# Patient Record
Sex: FEMALE | Race: OTHER
[De-identification: names, ages, dates, MRNs, and addresses within clinical notes are randomized per-mention and may not be internally consistent; named-entity substitution may affect disease eponyms.]

---

## 2021-03-16 PROBLEM — Z00.00 ENCOUNTER FOR PREVENTIVE HEALTH EXAMINATION: Status: ACTIVE | Noted: 2021-03-16

## 2021-04-06 PROBLEM — K21.9 GERD (GASTROESOPHAGEAL REFLUX DISEASE): Status: ACTIVE | Noted: 2021-04-06

## 2021-04-06 PROBLEM — Z86.16 HISTORY OF COVID-19: Status: RESOLVED | Noted: 2021-04-06 | Resolved: 2021-04-06

## 2021-04-06 PROBLEM — Z86.79 HISTORY OF HYPERTENSION: Status: RESOLVED | Noted: 2021-04-06 | Resolved: 2021-04-06

## 2021-04-07 ENCOUNTER — APPOINTMENT (OUTPATIENT)
Dept: GASTROENTEROLOGY | Facility: CLINIC | Age: 61
End: 2021-04-07

## 2021-04-07 DIAGNOSIS — Z86.79 PERSONAL HISTORY OF OTHER DISEASES OF THE CIRCULATORY SYSTEM: ICD-10-CM

## 2021-04-07 DIAGNOSIS — K21.9 GASTRO-ESOPHAGEAL REFLUX DISEASE W/OUT ESOPHAGITIS: ICD-10-CM

## 2021-04-07 DIAGNOSIS — Z86.16 PERSONAL HISTORY OF COVID-19: ICD-10-CM

## 2021-04-07 NOTE — HISTORY OF PRESENT ILLNESS
[de-identified] : OBINNA MATIAS  is being evaluated at the request of Eleanor Pizano for an opinion re:\par colon cancer screening and GERD

## 2023-04-20 ENCOUNTER — APPOINTMENT (OUTPATIENT)
Dept: GASTROENTEROLOGY | Facility: CLINIC | Age: 63
End: 2023-04-20
Payer: COMMERCIAL

## 2023-04-20 VITALS
SYSTOLIC BLOOD PRESSURE: 128 MMHG | BODY MASS INDEX: 28.55 KG/M2 | RESPIRATION RATE: 16 BRPM | HEIGHT: 60 IN | OXYGEN SATURATION: 98 % | HEART RATE: 56 BPM | WEIGHT: 145.44 LBS | DIASTOLIC BLOOD PRESSURE: 76 MMHG

## 2023-04-20 PROCEDURE — 99204 OFFICE O/P NEW MOD 45 MIN: CPT

## 2023-04-20 NOTE — CONSULT LETTER
[Dear  ___] : Dear  [unfilled], [Consult Letter:] : I had the pleasure of evaluating your patient, [unfilled]. [Please see my note below.] : Please see my note below. [Consult Closing:] : Thank you very much for allowing me to participate in the care of this patient.  If you have any questions, please do not hesitate to contact me. [Sincerely,] : Sincerely, [FreeTextEntry3] : Steffanie Monte M.D.\par

## 2023-04-20 NOTE — HISTORY OF PRESENT ILLNESS
[FreeTextEntry1] : 61 yo F here for liver mass. \par \par Patient reports she underwent liver biopsy somewhere in [place]. \par Per PCP note 4/22 - pt saw heme/onc and eval by tumor board. Had wokrup for liver mass. Biopsy performed - sclerosing hemangioma. Also mass at T12 which was apaprently suspicious but no clear primary. Was recommended to repeat imaging in a few months. Pt did not follow up. \par \par Pt then went to [facility] for R sided abd pain, found to have large 11-13 cm liver mass , probably giant cavernous hemangioma. \par \par Pt reports RUQ abd pain for 2 months\par rubs aloe vera in RUQ and this helps\par \par \par Soc: no tobacco or significant EtOH\par \par Family Hx: no significant GI family history including colon cancer, stomach cancer, IBD, celiac\par \par ROS:\par constitutional: no weight loss, fevers\par ENT: no deafness\par Eyes: no blindness\par Neck: no lymphadenopathy\par Chest: no shortness of breath, no cough\par Cardiac: no chest pain, no palpitations\par Vascular: no leg swelling\par GI: no abdominal pain, nausea, vomiting, diarrhea, constipation, rectal bleeding, melena, dysphagia,  unless otherwise noted in HPI\par : no dysuria, dark urine\par Skin: no rashes, lesions, jaundice\par Heme: no bleeding\par Endocrine: no DM  unless otherwise stated in HPI\par \par Physical Exam: (VS noted below)\par General: alert, comfortable, in no acute distress\par Eyes: normal sclera, anicteric\par Neck: normal, supple, no neck mass\par Pulm: no respiratory distress, clear to auscultation bilaterally \par Heart: RRR, normal S1 S2\par Abd: Soft, non-tender, non-distended, normal bowel sounds, no appreciable hepatosplenomegaly, no masses palpated\par Rectal: deferred\par Ext: warm and well perfused, no edema\par Skin: no rashes, no juandice\par Neuro: alert, grossly nonfocal\par \par Labs/imaging/prior endoscopic results were reviewed to the extent available and pertinent findings noted in HPI\par

## 2023-04-20 NOTE — ASSESSMENT
[FreeTextEntry1] : 63 yo F with 11-13 cm liver mass, likely giant cavernous hemangioma.  Pt reports her R sided abd pain is mild.\par \par Patient has already had biopsy of liver mass and eval [facility].\par \par Recommend she re-establish care at previous facility and/or referral to [facility] for multidisciplinary approach with hepatology team and tumor board.\par Given giant hemangioma >10cm, may be candidate for transcatheter arterial embolization +/- elective surgery\par \par I will discuss my recommendations directly with her PCP Dr. Pizano

## 2023-06-29 ENCOUNTER — APPOINTMENT (OUTPATIENT)
Dept: GASTROENTEROLOGY | Facility: CLINIC | Age: 63
End: 2023-06-29
Payer: COMMERCIAL

## 2023-06-29 VITALS
SYSTOLIC BLOOD PRESSURE: 110 MMHG | HEIGHT: 60 IN | BODY MASS INDEX: 28.47 KG/M2 | WEIGHT: 145 LBS | DIASTOLIC BLOOD PRESSURE: 70 MMHG

## 2023-06-29 DIAGNOSIS — Z12.11 ENCOUNTER FOR SCREENING FOR MALIGNANT NEOPLASM OF COLON: ICD-10-CM

## 2023-06-29 PROCEDURE — 99214 OFFICE O/P EST MOD 30 MIN: CPT

## 2023-06-29 NOTE — ASSESSMENT
[FreeTextEntry1] : - liver lesion - unclear if symptomatic but very large size warrants referral to hepatology / HBS - we made an appt for pt for next month w/ Dr. Zamora\par \par - Colon cancer screening - pt due for screening next year - encouraged colonoscopy vs repeat cologuard\par \par PMD/consultation/hospital notes and Labs/imaging/prior endoscopic results reviewed to extent noted in HPI; and, if procedure code billed on this visit for lab draw, this serves to signify that labs were drawn here in this office.\par

## 2023-06-29 NOTE — HISTORY OF PRESENT ILLNESS
[FreeTextEntry1] : 63f HTN, hx liver mass - here for f/u .  \par \par Pt does not provide any hx beyond vague R Flank discomfort for 1-2y.   Nonprandial.\par States had testing for liver lesion in liver at [facility] about 3-5y ago - including possible bx - Details not available.  She was in ER 3/2023 for vague R sided abd discomfort and had 11-13 cm liver mass possibly hemangioma - stable to slightly increased vs 2021 imaging.  Pt saw Dr. Monte 2mo ago and referred to hepatobiliary - never got appt.\par \par No new sx.  Feels otherwise well.  \par \par Had negative cologuard '21 per PMD note.\par \par Soc:  no tobacco or significant EtOH\par FHx: no FHx GI malignancy or IBD\par \par ROS:\par Constitutional:: no weight loss, fevers\par ENT: no deafness\par Eyes: not blind\par Neck: no LN\par Chest: no dyspnea/cough\par Cardiac: no chest pain\par Vascular: no leg swelling\par GI: no abdominal pain, nausea, vomiting, diarrhea, constipation, rectal bleeding, dysphagia, melena unless otherwise noted in HPI\par : no dysuria, dark urine\par Skin: no rashes, jaundice\par Heme: no bleeding\par Endocrine: no DM unless otherwise stated in HPI\par \par Px: (VS noted below)\par General: NAD\par Eyes: anicteric\par Oropharynx:  clear\par Neck: no LN\par Chest: normal respiratory effort\par CVS: regular\par Abd: soft, NT, ND, +BS, no HSM\par Ext: no atrophy\par Neuro: grossly nonfocal\par \par Labs/imaging/prior endoscopic results reviewed to the extent available and noted in HPI\par

## 2023-06-29 NOTE — CONSULT LETTER
[FreeTextEntry1] : Dear Dr. Pizano ,\par \par I had the pleasure of evaluating your patient,  OBINNA MATIAS.\par \par Please refer to my note below.\par \par Thank you very much for allowing me to participate in the care of this patient.  If you have any questions, please do not hesitate to contact me.\par \par Sincerely, \par \par Juan Carlos Louise MD\par

## 2023-07-25 ENCOUNTER — APPOINTMENT (OUTPATIENT)
Dept: HEPATOLOGY | Facility: CLINIC | Age: 63
End: 2023-07-25
Payer: COMMERCIAL

## 2023-07-25 VITALS
DIASTOLIC BLOOD PRESSURE: 74 MMHG | HEART RATE: 68 BPM | WEIGHT: 147 LBS | HEIGHT: 56 IN | BODY MASS INDEX: 33.07 KG/M2 | SYSTOLIC BLOOD PRESSURE: 136 MMHG

## 2023-07-25 DIAGNOSIS — D18.03 HEMANGIOMA OF INTRA-ABDOMINAL STRUCTURES: ICD-10-CM

## 2023-07-25 DIAGNOSIS — R16.0 HEPATOMEGALY, NOT ELSEWHERE CLASSIFIED: ICD-10-CM

## 2023-07-25 PROCEDURE — 99214 OFFICE O/P EST MOD 30 MIN: CPT

## 2023-07-31 NOTE — HISTORY OF PRESENT ILLNESS
[de-identified] : Large liver mass, likely hemangioma, seen by Dr. Gideon Carbone at [facility] [phone] fax [phone] It was biopsied at [facility]    [place] [id number]  Patient has nonspecific right flank pain which gets worse with physical activity and rotational movements of the body.  No weight loss.  Appetite is okay.  No nausea vomiting or diarrhea.  No chest pain shortness of breath or palpitation.  No family history of liver cancer.  No personal or family history of GI malignancies.  Vital signs are stable Well-built female patients with no distress No scleral icterus Regular heartbeats with no murmur Clear lungs Soft abdomen with no tenderness, organomegaly or ascites No edema of extremities

## 2023-07-31 NOTE — ASSESSMENT
[FreeTextEntry1] : Patient with liver mass which has grew probably by a small margin over the span of few years.  CT scan was read as hemangioma.  Patient had a biopsy of this mass done at [facility].  We will try to get the records and I encourage patient to go to the hospital and ask for the slides of liver biopsy to be sent to us. Clinical history and physical exam are not suggestive of pain being related to the liver mass. The pain is most likely musculoskeletal in nature. Natural history of hemangioma were discussed with patient in detail.  I do not think she would require or benefit from any intervention at this point.  We can consider a sonogram as a baseline test to see if that serves as a way of following this lesion as opposed to cross-sectional imaging.

## 2023-08-21 LAB
AFP-TM SERPL-MCNC: 2.9 NG/ML
ALBUMIN SERPL ELPH-MCNC: 4.3 G/DL
ALP BLD-CCNC: 153 U/L
ALT SERPL-CCNC: 22 U/L
ANION GAP SERPL CALC-SCNC: 14 MMOL/L
AST SERPL-CCNC: 17 U/L
BILIRUB SERPL-MCNC: 0.2 MG/DL
BUN SERPL-MCNC: 17 MG/DL
CALCIUM SERPL-MCNC: 9.4 MG/DL
CANCER AG19-9 SERPL-ACNC: 12 U/ML
CHLORIDE SERPL-SCNC: 106 MMOL/L
CO2 SERPL-SCNC: 23 MMOL/L
CREAT SERPL-MCNC: 0.56 MG/DL
EGFR: 104 ML/MIN/1.73M2
GLUCOSE SERPL-MCNC: 89 MG/DL
HBV CORE IGG+IGM SER QL: NONREACTIVE
HBV SURFACE AB SER QL: NONREACTIVE
HBV SURFACE AG SER QL: NONREACTIVE
HCV AB SER QL: NONREACTIVE
HCV S/CO RATIO: 0.46 S/CO
INR PPP: 0.98 RATIO
POTASSIUM SERPL-SCNC: 3.9 MMOL/L
PROT SERPL-MCNC: 7.6 G/DL
PT BLD: 11.1 SEC
SODIUM SERPL-SCNC: 142 MMOL/L

## 2024-10-28 ENCOUNTER — RESULT REVIEW (OUTPATIENT)
Age: 64
End: 2024-10-28

## 2024-11-02 DIAGNOSIS — E04.1 NONTOXIC SINGLE THYROID NODULE: ICD-10-CM

## 2024-11-04 ENCOUNTER — TRANSCRIPTION ENCOUNTER (OUTPATIENT)
Age: 64
End: 2024-11-04

## 2024-11-08 ENCOUNTER — TRANSCRIPTION ENCOUNTER (OUTPATIENT)
Age: 64
End: 2024-11-08

## 2024-11-12 ENCOUNTER — TRANSCRIPTION ENCOUNTER (OUTPATIENT)
Age: 64
End: 2024-11-12

## 2024-11-13 ENCOUNTER — APPOINTMENT (OUTPATIENT)
Dept: PAIN MANAGEMENT | Facility: CLINIC | Age: 64
End: 2024-11-13

## 2024-11-18 ENCOUNTER — APPOINTMENT (OUTPATIENT)
Dept: ENDOCRINOLOGY | Facility: CLINIC | Age: 64
End: 2024-11-18

## 2024-11-19 ENCOUNTER — APPOINTMENT (OUTPATIENT)
Dept: HEMATOLOGY ONCOLOGY | Facility: CLINIC | Age: 64
End: 2024-11-19

## 2024-11-21 ENCOUNTER — TRANSCRIPTION ENCOUNTER (OUTPATIENT)
Age: 64
End: 2024-11-21

## 2024-12-19 ENCOUNTER — RESULT REVIEW (OUTPATIENT)
Age: 64
End: 2024-12-19

## 2024-12-20 ENCOUNTER — RESULT REVIEW (OUTPATIENT)
Age: 64
End: 2024-12-20

## 2025-01-31 ENCOUNTER — APPOINTMENT (OUTPATIENT)
Dept: ENDOCRINOLOGY | Facility: CLINIC | Age: 65
End: 2025-01-31
Payer: COMMERCIAL

## 2025-01-31 VITALS
WEIGHT: 147 LBS | HEART RATE: 74 BPM | HEIGHT: 56 IN | DIASTOLIC BLOOD PRESSURE: 80 MMHG | OXYGEN SATURATION: 99 % | SYSTOLIC BLOOD PRESSURE: 140 MMHG | BODY MASS INDEX: 33.07 KG/M2

## 2025-01-31 DIAGNOSIS — E04.1 NONTOXIC SINGLE THYROID NODULE: ICD-10-CM

## 2025-01-31 PROCEDURE — 99215 OFFICE O/P EST HI 40 MIN: CPT

## 2025-09-11 ENCOUNTER — APPOINTMENT (OUTPATIENT)
Dept: VASCULAR SURGERY | Facility: CLINIC | Age: 65
End: 2025-09-11

## 2025-09-11 ENCOUNTER — NON-APPOINTMENT (OUTPATIENT)
Age: 65
End: 2025-09-11

## 2025-09-11 VITALS — BODY MASS INDEX: 32.84 KG/M2 | WEIGHT: 146 LBS | HEIGHT: 56 IN

## 2025-09-11 DIAGNOSIS — R20.2 PARESTHESIA OF SKIN: ICD-10-CM
